# Patient Record
Sex: MALE | Race: WHITE | NOT HISPANIC OR LATINO | Employment: OTHER | URBAN - METROPOLITAN AREA
[De-identification: names, ages, dates, MRNs, and addresses within clinical notes are randomized per-mention and may not be internally consistent; named-entity substitution may affect disease eponyms.]

---

## 2024-04-02 ENCOUNTER — OFFICE VISIT (OUTPATIENT)
Dept: SURGERY | Facility: CLINIC | Age: 44
End: 2024-04-02
Payer: OTHER GOVERNMENT

## 2024-04-02 VITALS
WEIGHT: 252 LBS | DIASTOLIC BLOOD PRESSURE: 76 MMHG | SYSTOLIC BLOOD PRESSURE: 122 MMHG | BODY MASS INDEX: 35.28 KG/M2 | HEIGHT: 71 IN

## 2024-04-02 DIAGNOSIS — Z12.11 SCREENING FOR COLON CANCER: ICD-10-CM

## 2024-04-02 DIAGNOSIS — K21.9 GASTROESOPHAGEAL REFLUX DISEASE, UNSPECIFIED WHETHER ESOPHAGITIS PRESENT: Primary | ICD-10-CM

## 2024-04-02 RX ORDER — CETIRIZINE HYDROCHLORIDE 5 MG/1
5 TABLET ORAL DAILY
COMMUNITY

## 2024-04-02 RX ORDER — RIBOFLAVIN (VITAMIN B2) 100 MG
TABLET ORAL
COMMUNITY

## 2024-04-02 NOTE — PROGRESS NOTES
General Surgery  Initial Office Visit    CC: Chronic acid reflux    HPI: The patient is a pleasant 43 y.o. year-old gentleman who presents today for discussion of chronic acid reflux that he is struggled with since 2004 intermittently.  His acid reflux will present with substernal chest pain that feels like a burning sensation.  It always occurs when he eats greasy foods and also when he drinks any form of white wine.  He uses Aciphex twice weekly, and Tums at least 1-2 times weekly with the onset of his symptoms.  He does not notice any association of acid reflux when he drinks coffee, sodas, citrus juices, beer, or liquor.  He is a non-smoker and only drinks 1 cup of coffee daily.  He drinks some sodas intermittently.  He was referred to see me for possible EGD and is in need of a screening colonoscopy given his age of 43 years.  He has never previously undergone colonoscopic evaluation and denies any family history of colon cancer.  He denies any melena or hematochezia.    Past Medical History:   Seasonal allergies  GERD    Past Surgical History:   Medical knee ACL reconstruction  Open umbilical hernia repair    Medications:   Vitamin C once daily  Cetirizine 5 mg daily  Aciphex twice weekly as needed  Tums 1-2 times weekly as needed    Allergies: No known drug allergies    Family History: No family history of gastrointestinal malignancy that he is aware of in his parents or siblings    Social History: Single, works as an , non-smoker, social alcohol use    ROS:   Constitutional: Negative for fevers or chills  HENT: Negative for hearing loss or runny nose  Eyes: Negative for vision changes or scleral icterus  Respiratory: Negative for cough or shortness of breath  Cardiovascular: Negative for chest pain or heart palpitations  Gastrointestinal: Positive for reflux; negative for abdominal distension, nausea, vomiting, constipation, melena, or hematochezia  Genitourinary: Negative for hematuria or  dysuria  Musculoskeletal: Negative for joint swelling or gait instability  Neurologic: Negative for tremors or seizures  Psychiatric: Negative for suicidal ideations or agitation  All other systems reviewed and negative    Physical Exam:  Height: 179 cm  Weight: 114 kg  BMI: 35.65  General: No acute distress, well-nourished & well-developed  HEAD: normocephalic, atraumatic  EYES: normal conjunctiva, sclera anicteric  EARS: grossly normal hearing  NECK: supple, no thyromegaly  CARDIOVASCULAR: regular rate and rhythm  RESPIRATORY: clear to auscultation bilaterally  GASTROINTESTINAL: soft, nontender, non-distended  MUSCULOSKELETAL: normal gait and station. No gross extremity abnormalities  PSYCHIATRIC: oriented x3, normal mood and affect    IMAGING:  None    ASSESSMENT & PLAN  Mr. Carrington is a 43-year-old with chronic GERD.  I would recommend scheduling him for an EGD.  He is also in need of a routine screening colonoscopy given his age of 43 years.  I discussed with him the risks of EGD and colonoscopy which include but are not limited to bleeding, gastrointestinal perforation, and possible missed pathology.  Despite these risks, he has consented to proceed.  If there is any evidence for gastritis, gastric ulcers, esophagitis, duodenal ulcers, etc. he will need to be started on daily Protonix and this was explained to him today.    Ivana Mcdermott MD  General, Robotic, and Endoscopic Surgery  [facility]    [address], [address]  [place] [address]  P: [phone]  F: [phone]

## 2024-05-10 RX ORDER — TRIAMCINOLONE ACETONIDE 1 MG/ML
LOTION TOPICAL AS NEEDED
COMMUNITY

## 2024-05-13 ENCOUNTER — ANESTHESIA (OUTPATIENT)
Dept: GASTROENTEROLOGY | Facility: HOSPITAL | Age: 44
End: 2024-05-13
Payer: OTHER GOVERNMENT

## 2024-05-13 ENCOUNTER — ANESTHESIA EVENT (OUTPATIENT)
Dept: GASTROENTEROLOGY | Facility: HOSPITAL | Age: 44
End: 2024-05-13
Payer: OTHER GOVERNMENT

## 2024-05-13 ENCOUNTER — HOSPITAL ENCOUNTER (OUTPATIENT)
Facility: HOSPITAL | Age: 44
Setting detail: HOSPITAL OUTPATIENT SURGERY
Discharge: HOME | End: 2024-05-13
Attending: SURGERY | Admitting: SURGERY
Payer: OTHER GOVERNMENT

## 2024-05-13 VITALS
HEIGHT: 71 IN | SYSTOLIC BLOOD PRESSURE: 158 MMHG | RESPIRATION RATE: 18 BRPM | DIASTOLIC BLOOD PRESSURE: 87 MMHG | WEIGHT: 254 LBS | BODY MASS INDEX: 35.56 KG/M2 | HEART RATE: 80 BPM | OXYGEN SATURATION: 92 %

## 2024-05-13 DIAGNOSIS — Z12.11 SCREENING FOR COLON CANCER: ICD-10-CM

## 2024-05-13 DIAGNOSIS — K21.9 GASTROESOPHAGEAL REFLUX DISEASE, UNSPECIFIED WHETHER ESOPHAGITIS PRESENT: ICD-10-CM

## 2024-05-13 PROBLEM — K20.90 ESOPHAGITIS: Status: ACTIVE | Noted: 2024-05-13

## 2024-05-13 PROBLEM — K29.70 GASTRITIS: Status: ACTIVE | Noted: 2024-05-13

## 2024-05-13 PROCEDURE — 88305 TISSUE EXAM BY PATHOLOGIST: CPT | Performed by: SURGERY

## 2024-05-13 PROCEDURE — 45378 DIAGNOSTIC COLONOSCOPY: CPT | Performed by: SURGERY

## 2024-05-13 PROCEDURE — S0260 H&P FOR SURGERY: HCPCS | Performed by: SURGERY

## 2024-05-13 PROCEDURE — 25010000002 PROPOFOL 1000 MG/100ML EMULSION: Performed by: NURSE ANESTHETIST, CERTIFIED REGISTERED

## 2024-05-13 PROCEDURE — 25810000003 LACTATED RINGERS PER 1000 ML: Performed by: SURGERY

## 2024-05-13 PROCEDURE — 25010000002 PROPOFOL 200 MG/20ML EMULSION: Performed by: NURSE ANESTHETIST, CERTIFIED REGISTERED

## 2024-05-13 PROCEDURE — 43239 EGD BIOPSY SINGLE/MULTIPLE: CPT | Performed by: SURGERY

## 2024-05-13 RX ORDER — LIDOCAINE HYDROCHLORIDE 10 MG/ML
0.5 INJECTION, SOLUTION INFILTRATION; PERINEURAL ONCE AS NEEDED
Status: CANCELLED | OUTPATIENT
Start: 2024-05-13

## 2024-05-13 RX ORDER — RABEPRAZOLE SODIUM 20 MG/1
20 TABLET, DELAYED RELEASE ORAL DAILY
COMMUNITY
End: 2024-05-13 | Stop reason: HOSPADM

## 2024-05-13 RX ORDER — PANTOPRAZOLE SODIUM 40 MG/1
40 TABLET, DELAYED RELEASE ORAL 2 TIMES DAILY
Qty: 30 TABLET | Refills: 1 | Status: SHIPPED | OUTPATIENT
Start: 2024-05-13

## 2024-05-13 RX ORDER — PROPOFOL 10 MG/ML
INJECTION, EMULSION INTRAVENOUS CONTINUOUS PRN
Status: DISCONTINUED | OUTPATIENT
Start: 2024-05-13 | End: 2024-05-13 | Stop reason: SURG

## 2024-05-13 RX ORDER — SODIUM CHLORIDE, SODIUM LACTATE, POTASSIUM CHLORIDE, CALCIUM CHLORIDE 600; 310; 30; 20 MG/100ML; MG/100ML; MG/100ML; MG/100ML
9 INJECTION, SOLUTION INTRAVENOUS CONTINUOUS
Status: CANCELLED | OUTPATIENT
Start: 2024-05-13

## 2024-05-13 RX ORDER — LIDOCAINE HYDROCHLORIDE 20 MG/ML
INJECTION, SOLUTION INFILTRATION; PERINEURAL AS NEEDED
Status: DISCONTINUED | OUTPATIENT
Start: 2024-05-13 | End: 2024-05-13 | Stop reason: SURG

## 2024-05-13 RX ORDER — SODIUM CHLORIDE 0.9 % (FLUSH) 0.9 %
10 SYRINGE (ML) INJECTION AS NEEDED
Status: DISCONTINUED | OUTPATIENT
Start: 2024-05-13 | End: 2024-05-13 | Stop reason: HOSPADM

## 2024-05-13 RX ORDER — SODIUM CHLORIDE 0.9 % (FLUSH) 0.9 %
10 SYRINGE (ML) INJECTION EVERY 12 HOURS SCHEDULED
Status: DISCONTINUED | OUTPATIENT
Start: 2024-05-13 | End: 2024-05-13 | Stop reason: HOSPADM

## 2024-05-13 RX ORDER — SODIUM CHLORIDE 0.9 % (FLUSH) 0.9 %
3 SYRINGE (ML) INJECTION EVERY 12 HOURS SCHEDULED
Status: CANCELLED | OUTPATIENT
Start: 2024-05-13

## 2024-05-13 RX ORDER — SODIUM CHLORIDE 9 MG/ML
40 INJECTION, SOLUTION INTRAVENOUS AS NEEDED
Status: DISCONTINUED | OUTPATIENT
Start: 2024-05-13 | End: 2024-05-13 | Stop reason: HOSPADM

## 2024-05-13 RX ORDER — SODIUM CHLORIDE 0.9 % (FLUSH) 0.9 %
3-10 SYRINGE (ML) INJECTION AS NEEDED
Status: CANCELLED | OUTPATIENT
Start: 2024-05-13

## 2024-05-13 RX ORDER — SODIUM CHLORIDE, SODIUM LACTATE, POTASSIUM CHLORIDE, CALCIUM CHLORIDE 600; 310; 30; 20 MG/100ML; MG/100ML; MG/100ML; MG/100ML
30 INJECTION, SOLUTION INTRAVENOUS CONTINUOUS PRN
Status: DISCONTINUED | OUTPATIENT
Start: 2024-05-13 | End: 2024-05-13 | Stop reason: HOSPADM

## 2024-05-13 RX ORDER — PROPOFOL 10 MG/ML
INJECTION, EMULSION INTRAVENOUS AS NEEDED
Status: DISCONTINUED | OUTPATIENT
Start: 2024-05-13 | End: 2024-05-13 | Stop reason: SURG

## 2024-05-13 RX ADMIN — PROPOFOL INJECTABLE EMULSION 150 MG: 10 INJECTION, EMULSION INTRAVENOUS at 12:48

## 2024-05-13 RX ADMIN — LIDOCAINE HYDROCHLORIDE 80 MG: 20 INJECTION, SOLUTION INFILTRATION; PERINEURAL at 12:47

## 2024-05-13 RX ADMIN — SODIUM CHLORIDE, POTASSIUM CHLORIDE, SODIUM LACTATE AND CALCIUM CHLORIDE 30 ML/HR: 600; 310; 30; 20 INJECTION, SOLUTION INTRAVENOUS at 12:09

## 2024-05-13 RX ADMIN — PROPOFOL 250 MCG/KG/MIN: 10 INJECTION, EMULSION INTRAVENOUS at 12:48

## 2024-05-13 NOTE — OP NOTE
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  I recommended proceeding with EGD today.  He is also in need of a routine screening colonoscopy given his age of 43 years.  He has been counseled on the risks of both upper and lower endoscopy to include bleeding, gastrointestinal perforation, and possible missed pathology.  Despite these risks, he has consented to proceed.    Findings:   EGD: Distal esophagitis with tongues of erythematous mucosa extending up the lower esophagus consistent with likely Arango's esophagus, antral gastritis  Colonoscopy: Nonbleeding grade 1 internal hemorrhoids    Description of procedure:  The patient was brought to the endoscopy suite and randal in the left lateral decubitus position.  A bite-block was inserted into the oropharynx.  Continuous propofol anesthesia was administered.  A surgical timeout was completed.  An upper endoscope was passed through the bite-block to the posterior oropharynx where the vocal cords and epiglottis were grossly normal.  The cervical esophagus was cannulated and the scope passed onward through the full length of the esophagus noting no evidence for proximal or mid esophageal inflammation.  At the level of the GE  junction there were tongues of erythematous mucosa stretching up the GE junction into the lower esophagus consistent with likely Arango's esophagus.  There was no evidence for hiatal hernia.  The scope was passed through the GE junction into the stomach which was maximally insufflated and showed evidence of antral gastritis.  There was no evidence for gastric ulcers.  The scope was passed through the pylorus entering the duodenal bulb which appeared unremarkable.  The second and third portion of duodenum also appeared unremarkable.  Duodenal biopsies were obtained to test for celiac disease and the scope slowly retracted back to the gastric antrum where mucosal biopsies were obtained to test for H. pylori.  The scope was retroflexed here and there was no abnormality evident within the gastric cardia or fundus.  Back at the level of the GE junction, multiple biopsies were obtained in all 4 quadrants to test for Arango's esophagus.  The scope was then slowly withdrawn out of the mouth and he was repositioned for colonoscopy.  A digital rectal exam was performed, revealing no abnormalities.  An adult colonoscope was then inserted through the anus and passed under direct visualization to the level of the cecum.  The cecum was identified via the ileocecal valve as well as the appendiceal orifice.  The scope was then slowly withdrawn, examining all circumferential walls of the ascending, transverse, descending, and sigmoid colon.  There were no gross abnormalities throughout the colon, specifically no colon polyps or diverticulosis.  Within the rectum the scope was retroflexed and showed nonbleeding grade 1 internal hemorrhoids.  The scope was then withdrawn and the colon desufflated.  The patient had a very good bowel prep and was transferred to the recovery area in stable condition.     Recommendations:  I will call the patient in 1 week or less with the pathology results of the upper endoscopy biopsies obtained today  and any further recommendations that may follow.  I have gone ahead and sent a prescription for Protonix twice daily to his pharmacy given findings of antral gastritis and likely Arango's esophagus.  He will require a repeat screening colonoscopy in 10 years.    Ivana Mcdermott MD  General, Robotic, and Endoscopic Surgery  [facility] [place]    [address], [address]  [place] [address]  P: [phone]  F: [phone]

## 2024-05-13 NOTE — ANESTHESIA POSTPROCEDURE EVALUATION
Patient: Lucas Carrington    Procedure Summary       Date: 05/13/24 Room / Location:  [facility]    Anesthesia Start: 1240 Anesthesia Stop: 1313    Procedures:       COLONOSCOPY TO CECUM      ESOPHAGOGASTRODUODENOSCOPY WITH COLD BIOPSIES (Esophagus) Diagnosis:       Gastroesophageal reflux disease, unspecified whether esophagitis present      Screening for colon cancer      (Gastroesophageal reflux disease, unspecified whether esophagitis present [K21.9])      (Screening for colon cancer [Z12.11])    Surgeons: Ivana Mcdermott MD Provider: Timothy Triplett MD    Anesthesia Type: MAC ASA Status: 2            Anesthesia Type: MAC    Vitals  Vitals Value Taken Time   /82 05/13/24 1312   Temp     Pulse 79 05/13/24 1316   Resp 16 05/13/24 1312   SpO2 93 % 05/13/24 1316   Vitals shown include unfiled device data.        Post Anesthesia Care and Evaluation    Patient location during evaluation: bedside  Patient participation: complete - patient participated  Level of consciousness: awake and alert  Pain management: adequate    Airway patency: patent  Anesthetic complications: No anesthetic complications  PONV Status: controlled  Cardiovascular status: blood pressure returned to baseline and acceptable  Respiratory status: acceptable  Hydration status: acceptable

## 2024-05-13 NOTE — ANESTHESIA PREPROCEDURE EVALUATION
Anesthesia Evaluation     Patient summary reviewed and Nursing notes reviewed   no history of anesthetic complications:   NPO Solid Status: > 8 hours  NPO Liquid Status: > 2 hours           Airway   Dental      Pulmonary    Cardiovascular         Neuro/Psych  GI/Hepatic/Renal/Endo    (+) obesity, GERD    Musculoskeletal     Abdominal   (+) obese   Substance History      OB/GYN          Other                          Anesthesia Plan    ASA 2     MAC     intravenous induction     Anesthetic plan, risks, benefits, and alternatives have been provided, discussed and informed consent has been obtained with: patient.        CODE STATUS:

## 2024-05-14 LAB
LAB AP CASE REPORT: NORMAL
PATH REPORT.FINAL DX SPEC: NORMAL
PATH REPORT.GROSS SPEC: NORMAL

## 2024-05-15 ENCOUNTER — TELEPHONE (OUTPATIENT)
Dept: SURGERY | Facility: CLINIC | Age: 44
End: 2024-05-15
Payer: OTHER GOVERNMENT

## 2024-05-15 NOTE — TELEPHONE ENCOUNTER
I called Lucas and left a voicemail on his cell phone regarding the pathology findings from his EGD and colonoscopy.  The EGD demonstrated no evidence for celiac disease or H. pylori.  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  I would recommend he begin taking Protonix 40 mg twice daily, and [facility].  I emphasized that Arango's esophagus is considered a precancerous change of the cells and can eventually progress to esophageal cancer if left untreated.  He will need routine surveillance EGDs once every 3 years to monitor his Arango's for any substantial progression.  His colonoscopy was unremarkable, so he will not need a repeat screening colonoscopy for 10 years.    Morenita, please update his health maintenance tab and recall pool to reflect a 3-year interval for EGD and a 10-year interval for colonoscopy.    Thanks,  YAJAIRA

## (undated) DEVICE — SENSR O2 OXIMAX FNGR A/ 18IN NONSTR

## (undated) DEVICE — FRCP BX RADJAW4 NDL 2.8 240CM LG OG BX40

## (undated) DEVICE — MSK ENDO PORT O2 POM ELITE CURAPLEX A/

## (undated) DEVICE — ADAPT CLN BIOGUARD AIR/H2O DISP

## (undated) DEVICE — BLCK/BITE BLOX W/DENTL/RIM W/STRAP 54F

## (undated) DEVICE — GOWN,SIRUS,NON REINFRCD,LARGE,SET IN SL: Brand: MEDLINE

## (undated) DEVICE — TUBING, SUCTION, 1/4" X 10', STRAIGHT: Brand: MEDLINE

## (undated) DEVICE — LN SMPL CO2 SHTRM SD STREAM W/M LUER

## (undated) DEVICE — KT ORCA ORCAPOD DISP STRL